# Patient Record
Sex: MALE | Race: WHITE
[De-identification: names, ages, dates, MRNs, and addresses within clinical notes are randomized per-mention and may not be internally consistent; named-entity substitution may affect disease eponyms.]

---

## 2018-07-11 ENCOUNTER — HOSPITAL ENCOUNTER (EMERGENCY)
Dept: HOSPITAL 25 - UCCORT | Age: 38
Discharge: HOME | End: 2018-07-11
Payer: COMMERCIAL

## 2018-07-11 VITALS — SYSTOLIC BLOOD PRESSURE: 108 MMHG | DIASTOLIC BLOOD PRESSURE: 78 MMHG

## 2018-07-11 DIAGNOSIS — Z88.0: ICD-10-CM

## 2018-07-11 DIAGNOSIS — M54.5: ICD-10-CM

## 2018-07-11 DIAGNOSIS — S83.91XA: Primary | ICD-10-CM

## 2018-07-11 DIAGNOSIS — F17.210: ICD-10-CM
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
## 2018-07-11 PROCEDURE — 96372 THER/PROPH/DIAG INJ SC/IM: CPT

## 2018-07-11 PROCEDURE — 99212 OFFICE O/P EST SF 10 MIN: CPT

## 2018-07-11 PROCEDURE — G0463 HOSPITAL OUTPT CLINIC VISIT: HCPCS

## 2018-07-11 NOTE — RAD
Indication: Right knee pain.



4 views of the right knee are reviewed. There is joint space narrowing in the lateral

compartment with subchondral eburnation and osteophyte formation. Small joint effusion is

noted. Mild degenerative changes of the patellofemoral joint is noted.



IMPRESSION: Degenerative joint disease in the lateral compartment of the right knee. There

may be a small joint effusion noted.

## 2018-07-11 NOTE — ED
Lower Extremity





- History of Current Complaint


Chief Complaint: UCGeneralIllness


Stated Complaint: S/P FALL LOWER BACK PAIN,RT KNEE


Time Seen by Provider: 07/11/18 14:53


Hx Obtained From: Patient


Mechanism Of Injury: Blunt Trauma


Onset of Pain: Immediate


Onset/Duration: Still Present


Severity Initially: Moderate


Severity Currently: Moderate


Pain Intensity: 8


Pain Scale Used: 0-10 Numeric


Timing: Constant, Lasting Hours


Location: Radiates To


Character Of Pain: Sharp


Associated Signs And Symptoms: Positive: Knee Pain


Aggravating Factor(s): Movement


Able to Bear Weight: Yes





- Risk Factors


Gout Risk Factors: Negative


DVT Risk Factors: Negative





- Allergies/Home Medications


Allergies/Adverse Reactions: 


 Allergies











Allergy/AdvReac Type Severity Reaction Status Date / Time


 


Penicillins Allergy  Itching Verified 07/11/18 14:36











Home Medications: 


 Home Medications





Amphetamine MIXED SALTS TAB* [Adderall TAB*] 1 tab DAILY 07/11/18 [History 

Confirmed 07/11/18]











PMH/Surg Hx/FS Hx/Imm Hx


Endocrine/Hematology History: 


   Denies: Hx Diabetes


Cardiovascular History: 


   Denies: Hx Hypertension, Hx Pacemaker/ICD


Respiratory History: 


   Denies: Hx Asthma


Musculoskeletal History: Reports: Hx Back Problems


Sensory History: 


   Denies: Hx Hearing Aid


Neurological History: Reports: Other Neuro Impairments/Disorders


Psychiatric History: Reports: Hx Substance Abuse


   Denies: Hx Panic Disorder





- Surgical History


Surgery Procedure, Year, and Place: LT KNEE ARTHROSCOPY,LT MIDDLE FINGER TENDON 

REPAIR


Infectious Disease History: No


Infectious Disease History: 


   Denies: Traveled Outside the [place] in Last 30 Days





- Social History


Alcohol Use: Occasionally


Substance Use Type: Reports: None


Substance Use Comment - Amount & Last Used: 11/18/14


Smoking Status (MU): Heavy Every Day Tobacco Smoker


Type: Cigarettes


Amount Used/How Often: 1/2 PPD


Have You Smoked in the Last Year: Yes


Cessation Counseling: Patient Advised to Stop





Review of Systems


Constitutional: Negative


Eyes: Negative


ENT: Negative


Cardiovascular: Negative


Respiratory: Negative


Gastrointestinal: Negative


Genitourinary: Negative


Musculoskeletal: Other


Positive: Decreased ROM


Skin: Negative


Neurological: Other


Positive: Paresthesia


Psychological: Normal, Other - adhd


All Other Systems Reviewed And Are Negative: Yes





- Comments


Additional Review of Systems Comments: 





patient with multiple prescriptions for adderal and alprazolam





Physical Exam


Triage Information Reviewed: Yes


Vital Signs On Initial Exam: 


 Initial Vitals











Temp Pulse Resp BP Pulse Ox


 


 37.2 C   120   20   108/78   99 


 


 07/11/18 14:37  07/11/18 14:37  07/11/18 14:37  07/11/18 14:37  07/11/18 14:37











Vital Signs Reviewed: Yes


Appearance: Positive: Well-Appearing, Pain Distress


Skin: Positive: Warm


Head/Face: Positive: Normal Head/Face Inspection


Eyes: Positive: Normal


ENT: Positive: Normal ENT inspection


Neck: Positive: Supple


Respiratory/Lung Sounds: Positive: Clear to Auscultation


Cardiovascular: Positive: Normal


Abdomen Description: Positive: Nontender


Bowel Sounds: Positive: Present


Musculoskeletal: Positive: Normal, Limited @, Other - decreased ROM of the 

right knee, no effusion, extension to 180 flexion to 110 negative Lynda


Neurological: Positive: Normal Gait, Other - patient with dysesthesia on the 

right lateral leg,  strength and dtrs symmetrical normal heel and toe walking,


Psychiatric: Positive: Normal





- Cleo Coma Scale


Best Eye Response: 4 - Spontaneous


Best Motor Response: 6 - Obeys Commands


Best Verbal Response: 5 - Oriented


Coma Scale Total: 15





Diagnostics





- Vital Signs


 Vital Signs











  Temp Pulse Resp BP Pulse Ox


 


 07/11/18 14:37  37.2 C  120  20  108/78  99














- Laboratory


Lab Statement: Any lab studies that have been ordered have been reviewed, and 

results considered in the medical decision making process.





Lower Extremity Course/Dx





- Course


Course Of Treatment: patient given toradol, ice to knee, xrays negative.  

recommend bedrest  , NSAIDS,.  no evidence muscular weakness,.  Patient with no 

evidence or ecchymosis or scratch seen.  possible drug  seeking behavior





- Diagnoses


Provider Diagnoses: 


 Knee sprain








Discharge





- Sign-Out/Discharge


Documenting (check all that apply): Patient Departure - discharged to home 





- Discharge Plan


Condition: Fair


Disposition: HOME


Referrals: 


No Primary Care Phys,NOPCP [Primary Care Provider] - 





- Billing Disposition and Condition


Condition: FAIR


Disposition: Home